# Patient Record
Sex: MALE | Race: WHITE
[De-identification: names, ages, dates, MRNs, and addresses within clinical notes are randomized per-mention and may not be internally consistent; named-entity substitution may affect disease eponyms.]

---

## 2017-12-01 NOTE — DIAGNOSTIC IMAGING REPORT
CHEST 2 VIEWS ROUTINE



CLINICAL HISTORY: COPD Dyspnea dyspnea



COMPARISON STUDY:  4/18/2006



FINDINGS: Slight chronic interstitial change. No focal infiltrates. No evidence

for cardiac enlargement. Diaphragms are smooth. 



IMPRESSION:  Slight/mild chronic interstitial prominence. No acute infiltrate. 











The above report was generated using voice recognition software.  It may contain

grammatical, syntax or spelling errors.









Electronically signed by:  Zheng Castro M.D.

12/1/2017 3:25 PM



Dictated Date/Time:  12/1/2017 3:24 PM

## 2017-12-04 NOTE — CODING QUERY MEDICAL NECESSITY
SUPPORTING DIAGNOSIS NEEDED



Dr. Franco,



A supporting diagnosis is required for the test/procedure performed on this patient in 
order for us to be reimbursed by the patient's insurance. Please provide a supporting 
diagnosis for the following test/procedure listed below next to the test name along with 
your signature. 



*If there is no additional diagnosis for this patient that would support the following 
test/procedure please document that below next to the test/procedure.



Test(s)/Procedure(s) that require a supporting diagnosis:





* [id number] VITAMIN D ASSAY          DIAGNOSIS:





***DATE OF SERVICE: 11/29/17***





Provider Signature:  ______________________________  Date:  _______



Thank you  

Pascual Pittman

[facility] Information Management

Phone:  [phone]

Fax:  [phone]



Once completed, please kindly fax back to [phone]



For questions please call [phone]

## 2018-01-15 NOTE — DIAGNOSTIC IMAGING REPORT
NUCLEAR MEDICINE PULMONARY VENTILATION/PERFUSION STUDY



CLINICAL HISTORY: Torus of breath. Pulmonary embolism.    



COMPARISON STUDY:  Chest x-ray dated 12/1/2017



FINDINGS: The patient was ventilated utilizing 31.2 mCi of technetium 99m DTPA

aerosol. The patient was subsequently perfused utilizing 6.2 mm series of

technetium 99m MAA.



There is diffusely heterogeneous perfusion. There are multiple matched and

mismatched defects. This study is of intermediate probability for pulmonary

embolism. CT angiography of the chest could be obtained in follow-up as deemed

clinically necessary.



IMPRESSION:  Significant airway disease. This limits the sensitivity and

specificity of this study. This examination is of intermediate probability for

pulmonary embolism. CT angiography of the chest could be obtained in follow-up

as deemed clinically necessary.







Electronically signed by:  Gavino Lynn M.D.

1/15/2018 7:47 PM



Dictated Date/Time:  1/15/2018 7:42 PM

## 2018-01-18 NOTE — DIAGNOSTIC IMAGING REPORT
BILATERAL LOWER EXTREMITY VENOUS DOPPLER



HISTORY:      I26.99 Pulmonary [id number] SOB (shortness of breath) on exe



COMPARISON STUDY:  None.



FINDINGS: There is normal compressibility and augmentation within the bilateral

lower extremity deep venous systems. There is slow flow throughout the bilateral

lower extremity venous systems.



IMPRESSION:  

No DVT within the right or left lower extremity.







Electronically signed by:  Ulysses Boston M.D.

1/18/2018 4:05 PM



Dictated Date/Time:  1/18/2018 4:05 PM

## 2018-01-18 NOTE — DIAGNOSTIC IMAGING REPORT
CHEST CTA for PULMONARY ARTERIES



CT DOSE: 492.34 mGycm



HISTORY:       Pulmonary [id number] SOB (shortness of breath



TECHNIQUE: Multiaxial CT images of the chest were performed following the

intravenous administration of contrast to evaluate the pulmonary arteries.

Maximal intensity projection images were also obtained.  A dose lowering

technique was utilized adhering to the principles of ALARA.



COMPARISON STUDY: Ventilation/perfusion scan 1/15/2018. Chest 12/1/2017.



FINDINGS: Normal caliber thoracic aorta. No evidence for dissection within the

opacified thoracic aorta. No pleural or pericardial effusions. No filling

defects within the pulmonary arteries to suggest pulmonary embolus. The heart is

normal in size. The visualized liver and spleen are unremarkable. The left

kidney appears surgically absent. Subcentimeter mediastinal lymph nodes do not

meet CT criteria for pathologic involvement. No fractures within the visualized

osseous structures. No pneumothorax. The central airways are patent. Mild

emphysema. A 5 mm nodule within the left upper lobe abutting the aortic arch on

image 246. A 5 mm nodule within the base of the left lower lobe on image 100. No

focal lung consolidations to suggest pneumonia. A 4 mm nodule within the right

middle lobe on image 114.



IMPRESSION: 



1. No evidence for pulmonary embolus.

2. Mild emphysema.

3. A few scattered pulmonary nodules as described above with the largest

measuring 5 mm. Please refer to the chart below for recommended follow-up.



Please refer to below summary of Fleischner criteria recommendations for

follow-up of incidental CT nodules (ELENO Soliz, Guidelines for management of

small pulmonary nodules detected on CT scans:  A statement from the Fleischner

Society, Radiology 237: [phone].)



SOLID NODULES



Solitary nodule size: <6 mm

*  Low risk patients:  no follow-up needed

*  high risk patients:  optional CT at 12 months



Solitary nodule size:  6-8 mm

*  Low risk patients:  follow-up at 6-12 months, then consider further follow-up

at 18-24 months

*  high risk patients:  initial follow-up CT at 6-12 months and then at 18-24

months if no change



Solitary nodule size: >8 mm

*  either low or high risk patients - consider follow-up CT at 3 months, and/or

CT-PET, and/or biopsy



Multiple nodules size:  <6 mm

*  Low risk patients:  no routine follow-up

*  high risk patients:  optional CT at 12 months



Multiple nodules size:  6-8 mm

*  Low risk patients:  follow-up at 3-6 months, then consider further follow-up

at 18-24 months

*  high risk patients:  follow-up at 3-6 months, then at 18-24 months if no

change



Multiple nodules size:  >8 mm

*  Low risk patients:  follow-up at 3-6 months, then consider further follow-up

at 18-24 months

*  high risk patients:  follow-up at 3-6 months, then at 18-24 months if no

change



Note:  newly detected indeterminate nodule in persons 35 years of age or older.

*  Low risk patients:  minimal or absent history of smoking and/or other known

risk factors

*  high risk patients:  history of smoking or of other known risk factors (e.g.

first degree relative with lung cancer, or exposure to asbestos, radon, uranium)

*  if a nodule up to 8 mm is partly solid or is ground glass further follow-up

is required after 24 months to exclude possible slow growing adenocarcinoma

(WARREN)



SUBSOLID NODULES



Solitary pure ground-glass nodule

*  nodule size <6 mm - no CT follow-up required

*  nodule size >=6 mm - follow-up CT at 6-12 months, then every 2 years until 5

years



Solitary part-solid nodule

*  nodule size <6 mm - no CT follow-up required

*  nodule size >=6 mm - follow-up CT at 3-6 months.  If unchanged, and solid

component remains <6 mm, then annual follow-up for 5 years



Multiple subsolid nodules

*  nodule size <6 mm - follow-up CT at 3-6 months, consider further follow-up at

2 and 4 years if stable

*  nodule size >=6 mm - follow-up CT at 3-6 months, subsequent management based

on the most suspicious nodule(s)







        







Electronically signed by:  Ulysses Boston M.D.

1/18/2018 5:19 PM



Dictated Date/Time:  1/18/2018 5:09 PM

## 2018-08-06 ENCOUNTER — HOSPITAL ENCOUNTER (EMERGENCY)
Dept: HOSPITAL 45 - C.EDB | Age: 48
Discharge: HOME | End: 2018-08-06
Payer: COMMERCIAL

## 2018-08-06 VITALS — OXYGEN SATURATION: 98 % | DIASTOLIC BLOOD PRESSURE: 68 MMHG | SYSTOLIC BLOOD PRESSURE: 109 MMHG | HEART RATE: 80 BPM

## 2018-08-06 VITALS — OXYGEN SATURATION: 96 %

## 2018-08-06 VITALS
HEIGHT: 74.02 IN | WEIGHT: 167.99 LBS | BODY MASS INDEX: 21.56 KG/M2 | BODY MASS INDEX: 21.56 KG/M2 | HEIGHT: 74.02 IN | WEIGHT: 167.99 LBS

## 2018-08-06 VITALS — TEMPERATURE: 97.88 F

## 2018-08-06 DIAGNOSIS — F17.200: ICD-10-CM

## 2018-08-06 DIAGNOSIS — R07.89: Primary | ICD-10-CM

## 2018-08-06 DIAGNOSIS — R73.9: ICD-10-CM

## 2018-08-06 LAB
ALBUMIN SERPL-MCNC: 4 GM/DL (ref 3.4–5)
ALP SERPL-CCNC: 85 U/L (ref 45–117)
ALT SERPL-CCNC: 40 U/L (ref 12–78)
AST SERPL-CCNC: 25 U/L (ref 15–37)
BUN SERPL-MCNC: 24 MG/DL (ref 7–18)
BUN SERPL-MCNC: 25 MG/DL (ref 7–18)
CALCIUM SERPL-MCNC: 8.2 MG/DL (ref 8.5–10.1)
CALCIUM SERPL-MCNC: 8.7 MG/DL (ref 8.5–10.1)
CK MB SERPL-MCNC: 1.5 NG/ML (ref 0.5–3.6)
CO2 SERPL-SCNC: 17 MMOL/L (ref 21–32)
CO2 SERPL-SCNC: 21 MMOL/L (ref 21–32)
CREAT SERPL-MCNC: 1.38 MG/DL (ref 0.6–1.4)
CREAT SERPL-MCNC: 1.53 MG/DL (ref 0.6–1.4)
EOSINOPHIL NFR BLD AUTO: 219 K/UL (ref 130–400)
GLUCOSE SERPL-MCNC: 323 MG/DL (ref 70–99)
GLUCOSE SERPL-MCNC: 542 MG/DL (ref 70–99)
HCT VFR BLD CALC: 49.2 % (ref 42–52)
HGB BLD-MCNC: 16.6 G/DL (ref 14–18)
INR PPP: 1 (ref 0.9–1.1)
MCH RBC QN AUTO: 30.9 PG (ref 25–34)
MCHC RBC AUTO-ENTMCNC: 33.7 G/DL (ref 32–36)
MCV RBC AUTO: 91.4 FL (ref 80–100)
PMV BLD AUTO: 11.5 FL (ref 7.4–10.4)
POTASSIUM SERPL-SCNC: 4.3 MMOL/L (ref 3.5–5.1)
POTASSIUM SERPL-SCNC: 5.3 MMOL/L (ref 3.5–5.1)
PROT SERPL-MCNC: 7.3 GM/DL (ref 6.4–8.2)
PTT PATIENT: 24.7 SECONDS (ref 21–31)
RED CELL DISTRIBUTION WIDTH CV: 12.7 % (ref 11.5–14.5)
RED CELL DISTRIBUTION WIDTH SD: 42.6 FL (ref 36.4–46.3)
SODIUM SERPL-SCNC: 125 MMOL/L (ref 136–145)
SODIUM SERPL-SCNC: 135 MMOL/L (ref 136–145)
WBC # BLD AUTO: 10.79 K/UL (ref 4.8–10.8)

## 2018-08-06 NOTE — DIAGNOSTIC IMAGING REPORT
SINGLE VIEW CHEST



CLINICAL HISTORY:  Atypical chest pain.



FINDINGS: 2 AP, portable, upright chest radiographs are compared to study dated

12/1/2017 and correlated with chest CT dated 1/18/2018. The examination is

degraded by portable technique and patient rotation.  The cardiomediastinal

silhouette is unremarkable. The lungs and pleural spaces are clear. No

pneumothorax is seen. The bony thorax is grossly intact.



IMPRESSION: No acute cardiopulmonary abnormality. 







Electronically signed by:  Silas Duffy M.D.

8/6/2018 11:06 AM



Dictated Date/Time:  8/6/2018 11:05 AM

## 2018-08-06 NOTE — EMERGENCY ROOM VISIT NOTE
History


First contact with patient:  10:59


Chief Complaint:  CHEST PAIN


Stated Complaint:  CHEST PAIN,SOB,HYPERGLYCEMIA


Nursing Triage Summary:  


PT HERE WITH CHEST PAIN SINCE THIS AM. PT STATES NO CARDIAC HX. HAD SAME PAINS 


WHEN HE WAS DX WITH DM. PT WEARS INSULIN PUMP BUT IT FELL OUT LAST PM AND PT IS 


NOT GETTING NEEDLES UNTIL THIS AM. PT DENIES ANY SOB OR NAUSEA. SOME DISCOMFORT 


IN L SHOULDER





History of Present Illness


The patient is a 48 year old male who presents to the Emergency Room with 

complaints of chest pain which began this morning.  The patient states he was 

not feeling well last night and had complained of increased gas.  He states the 

pain began at approximately 8 AM and radiated across his chest and down his 

left arm into his fingertips.  He states his fingertips did begin feeling 

slightly numb.  He states his port for his insulin pump came out yesterday and 

he is currently waiting for needles to be delivered.  He has not received any 

insulin since yesterday afternoon.  The patient states his pain is feeling 

better now.  He denies any aggravation or alleviation of the pain and has taken 

no medications.  He has had pain similar to this in the past when he was 

diagnosed with diabetes.  He denies any dizziness, nausea, vomiting, or edema.  

He does report some mild dyspnea.  He denies any recent illness including fever

, chills, or congestion.  Patient is followed by Gabby keenan from 

endocrinology.  He has not contacted their office regarding the need for his 

pump supplies.  He states he did take 10 units of NovoLog this morning.





Review of Systems


A complete 10 point review of systems was reviewed with the patient with 

pertinent positives and negatives as per history of present illness. All else 

were negative.





Past Medical/Surgical History


Diabetes, hypertension, hyperlipidemia





Social History


Smoking Status:  Current Every Day Smoker


Smokeless Tobacco Use:  No


Alcohol Use:  occasionally


Drug Use:  none


Marital Status:  


Housing Status:  lives with family


Occupation Status:  employed





Current/Historical Medications


Scheduled


Atorvastatin (Lipitor), 40 MG PO QPM


Insulin Aspart (novoLOG INSULIN PUMP ), 1 DOSE N/A UD


Levothyroxine Sodium (Levothyroxine Sodium), 100 MCG PO QAM


Lisinopril (Lisinopril), 2.5 MG PO DAILY





Scheduled PRN


Albuterol Hfa (Ventolin Hfa), 2 PUFFS INH Q4-6HRS PRN for SOB/Wheezing





Physical Exam


Vital Signs











  Date Time  Temp Pulse Resp B/P (MAP) Pulse Ox O2 Delivery O2 Flow Rate FiO2


 


8/6/18 15:15  80 18 109/68 98   


 


8/6/18 14:39  79 22 103/63 99   


 


8/6/18 14:09  68      


 


8/6/18 13:27     96 Room Air  


 


8/6/18 13:26  68 18 110/71 97   


 


8/6/18 11:41  92 20 114/75 98 Room Air  


 


8/6/18 11:00     98 Room Air  


 


8/6/18 10:38  88      


 


8/6/18 10:24 36.6 89 20 125/79 98 Room Air  











Physical Exam


VITALS: Vitals are noted on the nurse's note and reviewed by myself.  Vital 

signs stable.


GENERAL: This is a 48-year-old white male, in no acute distress, nondiaphoretic

, well-developed well-nourished.


SKIN: The skin was without rashes, erythema, edema, or bruising.  There is no 

tenting of the skin.  Capillary reflex less than 2 seconds.


HEAD: Normocephalic atraumatic.  


EARS: External auditory canals clear, tympanic membranes pearly gray without 

erythema or effusion bilaterally.


EYES: Pupils equal round and reactive to light and accommodation.  Conjunctivae 

without injection, sclerae without icterus.  Extraocular movements intact.  


NOSE: Patent, turbinates without inflammation or discharge.  No sinus 

tenderness.


MOUTH: Mucous membranes moist.  Tonsils are not enlarged.  Pharynx without 

erythema or exudate.  Uvula midline.  Airway patent.  Tongue does not deviate.  


NECK: Supple without nuchal rigidity.  No lymphadenopathy.  No thyromegaly.  

Cervical spine is nontender.  No JVD.


HEART: Regular rate and rhythm without murmurs gallops or rubs.


LUNGS: Clear to auscultation bilaterally without wheezes, rales or rhonchi.  No 

dullness to percussion.  No retractions or accessory muscle use.


ABDOMEN: Positive bowel sounds x 4.  Normal tympanic percussion.  Soft, 

nontender, without masses or organomegaly.  Fuentes sign negative.  No guarding 

or rebound tenderness.


MUSCULOSKELETAL: No muscle atrophy, erythema, or edema noted.  Full range of 

motion without joint tenderness in all extremities.  No tenderness to 

palpation.  Normal gait.  Strength 5/5 throughout.


NEURO: Patient was alert and oriented to person place and time.  Normal 

sensation to light and sharp touch.  Deep tendon reflexes 2+ throughout.  No 

focal neurological deficits.





Medical Decision & Procedures


ER Provider


Diagnostic Interpretation:


SINGLE VIEW CHEST





CLINICAL HISTORY:  Atypical chest pain.





FINDINGS: 2 AP, portable, upright chest radiographs are compared to study dated


12/1/2017 and correlated with chest CT dated 1/18/2018. The examination is


degraded by portable technique and patient rotation.  The cardiomediastinal


silhouette is unremarkable. The lungs and pleural spaces are clear. No


pneumothorax is seen. The bony thorax is grossly intact.





IMPRESSION: No acute cardiopulmonary abnormality. 











Electronically signed by:  Silas Duffy M.D.


8/6/2018 11:06 AM





Dictated Date/Time:  8/6/2018 11:05 AM





Laboratory Results


8/6/18 10:35








8/6/18 13:38

















Test


  8/6/18


10:32 8/6/18


10:35 8/6/18


11:05 8/6/18


11:24


 


Bedside Glucose


  508 mg/dl


(70-99) 


  


  


 


 


Red Blood Count


  


  5.38 M/uL


(4.7-6.1) 


  


 


 


Mean Corpuscular Volume


  


  91.4 fL


() 


  


 


 


Mean Corpuscular Hemoglobin


  


  30.9 pg


(25-34) 


  


 


 


Mean Corpuscular Hemoglobin


Concent 


  33.7 g/dl


(32-36) 


  


 


 


RDW Standard Deviation


  


  42.6 fL


(36.4-46.3) 


  


 


 


RDW Coefficient of Variation


  


  12.7 %


(11.5-14.5) 


  


 


 


Mean Platelet Volume


  


  11.5 fL


(7.4-10.4) 


  


 


 


Prothrombin Time


  


  10.0 SECONDS


(9.0-12.0) 


  


 


 


Prothromb Time International


Ratio 


  1.0 (0.9-1.1) 


  


  


 


 


Activated Partial


Thromboplast Time 


  24.7 SECONDS


(21.0-31.0) 


  


 


 


Partial Thromboplastin Ratio  1.0   


 


D-Dimer


  


  250 ug/L FEU


(0-500) 


  


 


 


Total Bilirubin


  


  1.5 mg/dl


(0.2-1) 


  


 


 


Aspartate Amino Transf


(AST/SGOT) 


  25 U/L (15-37) 


  


  


 


 


Alanine Aminotransferase


(ALT/SGPT) 


  40 U/L (12-78) 


  


  


 


 


Alkaline Phosphatase


  


  85 U/L


() 


  


 


 


Total Creatine Kinase


  


  110 U/L


() 


  


 


 


Creatine Kinase MB


  


  1.5 ng/ml


(0.5-3.6) 


  


 


 


Creatine Kinase MB Ratio  1.4 (0-3.0)   


 


Total Protein


  


  7.3 gm/dl


(6.4-8.2) 


  


 


 


Albumin


  


  4.0 gm/dl


(3.4-5.0) 


  


 


 


Globulin


  


  3.3 gm/dl


(2.5-4.0) 


  


 


 


Albumin/Globulin Ratio  1.2 (0.9-2)   


 


Urine Color   YELLOW  


 


Urine Appearance   CLEAR (CLEAR)  


 


Urine pH   5.0 (4.5-7.5)  


 


Urine Specific Gravity


  


  


  1.027


(1.000-1.030) 


 


 


Urine Protein   NEG (NEG)  


 


Urine Glucose (UA)   3+ (NEG)  


 


Urine Ketones   3+ (NEG)  


 


Urine Occult Blood   NEG (NEG)  


 


Urine Nitrite   NEG (NEG)  


 


Urine Bilirubin   NEG (NEG)  


 


Urine Urobilinogen   NEG (NEG)  


 


Urine Leukocyte Esterase   NEG (NEG)  


 


Lactic Acid Level


  


  


  


  1.9 mmol/L


(0.4-2.0)


 


Lipase


  


  


  


  101 U/L


()


 


Test


  8/6/18


12:22 8/6/18


13:38 


  


 


 


Bedside Troponin I


  < 0.030 ng/ml


(0-0.045) 


  


  


 


 


Anion Gap


  


  11.0 mmol/L


(3-11) 


  


 


 


Est Creatinine Clear Calc


Drug Dose 


  70.6 ml/min 


  


  


 


 


Estimated GFR (


American) 


  69.6 


  


  


 


 


Estimated GFR (Non-


American 


  60.0 


  


  


 


 


BUN/Creatinine Ratio  18.0 (10-20)   


 


Calcium Level


  


  8.2 mg/dl


(8.5-10.1) 


  


 


 


Beta-Hydroxybutyric Acid


  


  33.32 mg/dL


(0.2-2.81) 


  


 











Medications Administered











 Medications


  (Trade)  Dose


 Ordered  Sig/Jeffy


 Route  Start Time


 Stop Time Status Last Admin


Dose Admin


 


 Sodium Chloride  1,000 ml @ 


 999 mls/hr  Q1H1M STAT


 IV  8/6/18 11:01


 8/6/18 12:01 DC 8/6/18 11:11


999 MLS/HR


 


 Sodium Chloride  1,000 ml @ 


 999 mls/hr  Q1H1M STAT


 IV  8/6/18 11:50


 8/6/18 12:50 DC 8/6/18 11:59


999 MLS/HR


 


 Insulin Human


 Regular


  (novoLIN-R U-100


 PER UNIT)  10 units  NOW  STAT


 IV  8/6/18 11:50


 8/6/18 11:52 DC 8/6/18 12:00


10 UNITS











ECG Per My Interpretation


Indication:  chest pain


Rate (beats per minute):  81


Rhythm:  sinus with SA


Findings:  no acute ischemic change, no ectopy


Comparison ECG Date:  2005


Change:  no significant change (previous sinus bradycardia with SA, current 

sinus rhythm with SA)





ED Course


Critical pathways initiated by nursing staff.





IV access obtained, labs drawn.





Imaging performed and reviewed by myself and radiologist as above.





The patient was seen and evaluated as above.





Labs reviewed by myself.





I discussed the case with my attending.





I consulted with the patient's endocrinology provider, Gabby Renee.





The patient was given 2L IV NSS and 10 units of insulin IV.





Repeat BMP performed and reviewed by myself.





I discussed the findings with the patient at bedside. The patient had hooked up 

his sensor and states his blood sugar is continuing to drop. He requested to be 

discharged so he can go to endocrinology and obtain supplies needed for his 

insulin pump.





Discharge instructions reviewed, the patient was discharged home in good 

condition.





Medical Decision


This is a 48-year-old male patient presents emergency department today 

complaining of chest pain.  The patient has experienced similar pain with 

hyperglycemia and diagnosis of diabetes.  Cardiac workup here in the emergency 

department was negative.  Suspect the patient's symptoms are related to the 

hyperglycemia, as his chest pain improved while here in the emergency 

department as his blood sugar improved as well.  Negative troponins 2.  Patient

's CBC is without leukocytosis, anemia, thrombocytopenia.  Coagulation factors 

were normal.  D-dimer was negative.  Urinalysis with positive ketones, but 

negative for infection or blood. POC Glucose 508. Initial CMP concerning for 

early DKA with elevated BSG of 542, hyponatremia of 125, hyperkalemia 5.3, 

anion gap 14.0 and elevated creatinine of 1.53.  Lactic acid 1.9.  Hepatic 

function without significant abnormalities, however bilirubin slightly elevated 

at 1.5.  Beta hydroxybutyric acid 48.88.  Initial troponin and CK-MB were 

normal.  Lipase was normal.  Repeat troponin at 90 minutes was negative.  

Repeat BMP after 2 L normal saline solution and 10 units of insulin was 

significantly improved.  Sodium has improved to 135.  Potassium 4.3.  Anion gap 

11.0.  Creatinine 1.38.  Glucose has improved to 323.  Beta-Hydroxybutyric Acid 

33.32.  At this point, I had intended to give the patient another 6 units of IV 

insulin, however he has inserted his sensor and states his blood sugar has 

improved to the 280s and would like to go home.  The patient is planning on 

going straight to the endocrinology office to obtain supplies for his pump as 

well as insulin.  He is concerned that his sugar may continue to drop and would 

like to get something to eat.  I do feel that this is reasonable, as after 

speaking with the patient's endocrinology provider, his sugars typically run in 

the 200s-300s with a recent A1c of 11.9.  Discharge instructions were reviewed, 

and all questions answered patient satisfaction prior to discharge.  He was 

given strict return precautions.





Etiologies such as cardiac ischemia, aortic dissection, pulmonary embolism, 

pneumonia, pneumothorax, musculoskeletal, infections, gastrointestinal, DKA, 

hyperglycemia, as well as others were entertained.  





The chart was completed utilizing Dragon Speech voice recognition software. 

Grammatical errors, random word insertions, pronoun errors, and incomplete 

sentences are an occasional consequence of this system due to software 

limitations, ambient noise, and hardware issues. Any formal questions or 

concerns about the content, text, or information contained within the body of 

this dictation should be directly addressed to the provider for clarification.





Medication Reconcilliation


Current Medication List:  was personally reviewed by me





Blood Pressure Screening


Patient's blood pressure:  Normal blood pressure





Consults


Consulting Physician:  Gabby Renee


I contacted ROBBI Jarrett with endocrinology.  She is very familiar with 

the patient and provided me with some background information that the patient 

is a  and has been afraid his sugar would drop below while he is working.

  He is normally in the 300s, and his last A1c was checked in July and was 

11.9.  He was started on the pump to avoid his sugars from dropping too low, 

however does not have insurance so it has been difficult to get back in for 

follow-up.  She advised that she feels comfortable of the patient's 

electrolytes and glucose improve while here in the emergency department and we 

can get him hydrated to send him home.  She stated that he could stop by the 

office and be seen by the diabetic educator.  They do have insulin in the 

office as well as pump supplies that they can supply the patient with until he 

is able to get his package delivered.  She did request that I contact her back 

at extension [phone] after repeat labs and disposition.





Impression





 Primary Impression:  


 Non-cardiac chest pain


 Additional Impression:  


 Hyperglycemia





Departure Information


Dispostion


Home / Self-Care





Condition


GOOD





Referrals


JESSICA Herrera MD (PCP)








Víctor, Gabby GAMBOA





Patient Instructions


ED Chest Pain NonCardiac, ED Hyperglycemia Diabetic, My [facility]





Additional Instructions





You were seen in the emergency department today for hyperglycemia.  As discussed

, you do need to follow-up with your endocrinologist regarding ongoing 

management and treatment of your blood sugar.





Drink plenty of fluids and stay well-hydrated.





Go straight to endocrinology office to obtain the needed parts for your pump.  

Speak with the diabetic educator there regarding management of your diabetes 

and current elevated blood sugar.





You may eat, but you do need to bolus your insulin properly.





Follow the sliding scale you have at home.





Return immediately to the emergency department for any significantly worsening 

chest pain, difficulty breathing, elevated or low blood sugar causing symptoms, 

or other concerning symptoms.





Problem Qualifiers